# Patient Record
Sex: MALE
[De-identification: names, ages, dates, MRNs, and addresses within clinical notes are randomized per-mention and may not be internally consistent; named-entity substitution may affect disease eponyms.]

---

## 2022-11-09 PROBLEM — Z00.00 ENCOUNTER FOR PREVENTIVE HEALTH EXAMINATION: Status: ACTIVE | Noted: 2022-11-09

## 2022-11-15 ENCOUNTER — APPOINTMENT (OUTPATIENT)
Dept: OTOLARYNGOLOGY | Facility: CLINIC | Age: 67
End: 2022-11-15

## 2022-12-16 ENCOUNTER — APPOINTMENT (OUTPATIENT)
Dept: OTOLARYNGOLOGY | Facility: CLINIC | Age: 67
End: 2022-12-16

## 2023-01-05 ENCOUNTER — APPOINTMENT (OUTPATIENT)
Dept: OTOLARYNGOLOGY | Facility: CLINIC | Age: 68
End: 2023-01-05
Payer: MEDICARE

## 2023-01-05 DIAGNOSIS — F17.200 NICOTINE DEPENDENCE, UNSPECIFIED, UNCOMPLICATED: ICD-10-CM

## 2023-01-05 DIAGNOSIS — Z86.79 PERSONAL HISTORY OF OTHER DISEASES OF THE CIRCULATORY SYSTEM: ICD-10-CM

## 2023-01-05 DIAGNOSIS — Z86.018 PERSONAL HISTORY OF OTHER BENIGN NEOPLASM: ICD-10-CM

## 2023-01-05 DIAGNOSIS — R04.0 EPISTAXIS: ICD-10-CM

## 2023-01-05 DIAGNOSIS — Z80.9 FAMILY HISTORY OF MALIGNANT NEOPLASM, UNSPECIFIED: ICD-10-CM

## 2023-01-05 DIAGNOSIS — Z78.9 OTHER SPECIFIED HEALTH STATUS: ICD-10-CM

## 2023-01-05 DIAGNOSIS — Z85.828 PERSONAL HISTORY OF OTHER MALIGNANT NEOPLASM OF SKIN: ICD-10-CM

## 2023-01-05 DIAGNOSIS — Z21 ASYMPTOMATIC HUMAN IMMUNODEFICIENCY VIRUS [HIV] INFECTION STATUS: ICD-10-CM

## 2023-01-05 DIAGNOSIS — B20 HUMAN IMMUNODEFICIENCY VIRUS [HIV] DISEASE: ICD-10-CM

## 2023-01-05 DIAGNOSIS — Z86.19 PERSONAL HISTORY OF OTHER INFECTIOUS AND PARASITIC DISEASES: ICD-10-CM

## 2023-01-05 PROCEDURE — 31231 NASAL ENDOSCOPY DX: CPT

## 2023-01-05 PROCEDURE — 99202 OFFICE O/P NEW SF 15 MIN: CPT | Mod: 25

## 2023-01-05 RX ORDER — DARUNAVIR 800 MG/1
TABLET, FILM COATED ORAL
Refills: 0 | Status: ACTIVE | COMMUNITY

## 2023-01-05 RX ORDER — RITONAVIR 100 MG/1
CAPSULE ORAL
Refills: 0 | Status: ACTIVE | COMMUNITY

## 2023-01-05 RX ORDER — ETRAVIRINE 25 MG/1
TABLET ORAL
Refills: 0 | Status: ACTIVE | COMMUNITY

## 2023-01-05 RX ORDER — HYDROCHLOROTHIAZIDE 12.5 MG/1
TABLET ORAL
Refills: 0 | Status: ACTIVE | COMMUNITY

## 2023-01-06 PROBLEM — R04.0 NOSEBLEED: Status: ACTIVE | Noted: 2023-01-05

## 2023-01-06 NOTE — ASSESSMENT
[FreeTextEntry1] : No evidence of active bleed or lesion.\par I recommend XLear.\par I recommend Vaseline to the front of his nose before sleep.\par Follow-up on an as-needed basis.

## 2023-01-06 NOTE — HISTORY OF PRESENT ILLNESS
[de-identified] : Reports that approximately 1 month ago he had an episode of congestion in his nose.  When he manipulated his nose he developed very significant epistaxis.  This required a visit to the emergency room.  By his report they had difficulty controlling the bleeding.\par He has not had any bleeding since that time.\par He does not have a history of bleeding dyscrasias.\par He does not take blood thinning medication.

## 2023-01-06 NOTE — PROCEDURE
[FreeTextEntry6] : PROCEDURE NOTES\par \par PROCEDURE: Nasal endoscopy \par SURGEON: Dr. Collins\par INDICATIONS: Assess for chronic sinusitis. \par ANESTHESIA: The patient was placed in a sitting position.  Following application of the topical anesthetic and decongestant, exam was performed with a zero degree endoscope.  The scope was passed along the right nasal floor to the nasopharynx.  It was then passed into the region of the middle meatus, middle turbinate, and sphenoethmoid region.  An identical procedure was performed on the left side.  The following findings were noted:\par \par The nasal mucosa was healthy appearing and the septum was roughly midline. The middle meatus and sphenoethmoid recesses were clear bilaterally. The nasopharynx was normal. \par

## 2023-05-24 ENCOUNTER — APPOINTMENT (OUTPATIENT)
Dept: COLORECTAL SURGERY | Facility: CLINIC | Age: 68
End: 2023-05-24

## 2023-05-24 NOTE — HISTORY OF PRESENT ILLNESS
[FreeTextEntry1] : 69 yo M scheduled for initial evaluation of anal mass\par \par Per outside record scanned November 2022, followed by PCP at [facility]. \par h/o HIV on Biktarvy, Prezista/norvir, etravirine, CD4 300s as of November 2022, HSV, warts, anal mass, constipation, GERD, bipolar with anxiety, anemia, HTN, testicular hypofunction, hypogonadism, tobacco use\par h/o anal cancer, s/p surgery in 2017\par \par November 2022 labs notable for normocytic anemia, hgb/hct 7.8/24.3, with normal total iron (415), ferrintin 60, iron saturation 95%, on oral iron and Bcomplex/B12,and multivitamin\par \par Per outside record, referral placed to CRS on 11/2, then again, urgent referral placed to CRS at [facility]  on 11/14/2022 for large rectal mass, given h/o HIV and anemia to r/o anal cancer\par \par Pt scheduled for visit on 5/24/23. Office attempted on 5/22-5/23 to reschedule visit to 5/23, however unable to reach pt. Pt also no showed 5/24/23 visit.

## 2024-06-13 ENCOUNTER — APPOINTMENT (OUTPATIENT)
Dept: UROLOGY | Facility: CLINIC | Age: 69
End: 2024-06-13

## 2024-06-13 VITALS
SYSTOLIC BLOOD PRESSURE: 113 MMHG | HEIGHT: 67 IN | TEMPERATURE: 98.3 F | DIASTOLIC BLOOD PRESSURE: 77 MMHG | BODY MASS INDEX: 21.97 KG/M2 | WEIGHT: 140 LBS

## 2024-06-13 DIAGNOSIS — Z86.73 PERSONAL HISTORY OF TRANSIENT ISCHEMIC ATTACK (TIA), AND CEREBRAL INFARCTION W/OUT RESIDUAL DEFICITS: ICD-10-CM

## 2024-06-13 DIAGNOSIS — R97.20 ELEVATED PROSTATE, SPECIFIC ANTIGEN [PSA]: ICD-10-CM

## 2024-06-13 DIAGNOSIS — Z86.79 PERSONAL HISTORY OF OTHER DISEASES OF THE CIRCULATORY SYSTEM: ICD-10-CM

## 2024-06-13 PROCEDURE — ZZZZZ: CPT

## 2024-06-13 NOTE — PHYSICAL EXAM
[General Appearance - Well Developed] : well developed [General Appearance - In No Acute Distress] : no acute distress [] : no respiratory distress [No Focal Deficits] : no focal deficits [Oriented To Time, Place, And Person] : oriented to person, place, and time

## 2024-06-13 NOTE — HISTORY OF PRESENT ILLNESS
[FreeTextEntry1] :  CC: Elevated PSA  69 year old male h/o HIV on Biktarvy, Prezista/norvir, etravirine, CD4 300s as of November 2022, HSV, warts, anal mass, constipation, GERD, bipolar with anxiety, anemia, HTN, testicular hypofunction, hypogonadism, tobacco use h/o anal cancer, s/p surgery in 2017  No family hx of prostate cancer No prior prostate biopsy or MRI  PSA 8.9 ng/mL  2/2024  Urinary symptoms include urinary urgency and frequency, nocturia x 2-3, occasional straining to initiate stream,  Denies any hematuria or dysuria.  Believes he may be on finasteride but is not sure   Surgical Hx: none Social Hx: current smoker 10-12 cigarettes/day for 50 years, daily marijuana use, no ETOH Family Hx: noncontributory, No CaP

## 2024-06-13 NOTE — ASSESSMENT
[FreeTextEntry1] : Diagnosis: Elevated PSA  Plan: PVR 21  cc PSA today MRI prostate then consider biopsy based on MRI findings Follow up with colorectal surgery   RTC after MRI     Charles Alexander MD, FACS, FRCS  of Urology [facility] Director of Laparoscopic and Robotic Surgery [facility] Director of Urology, [facility] Professor of Urology (Office) [phone] (Cell) [phone] [email or web address]   I performed history/review of imaging, discussed treatment plan with patient, agree with above transcription by ANTON.

## 2024-06-14 LAB — PSA SERPL-MCNC: 7.44 NG/ML

## 2024-06-20 ENCOUNTER — RESULT REVIEW (OUTPATIENT)
Age: 69
End: 2024-06-20

## 2024-06-20 ENCOUNTER — APPOINTMENT (OUTPATIENT)
Dept: MRI IMAGING | Facility: CLINIC | Age: 69
End: 2024-06-20
Payer: MEDICARE

## 2024-06-20 ENCOUNTER — OUTPATIENT (OUTPATIENT)
Dept: OUTPATIENT SERVICES | Facility: HOSPITAL | Age: 69
LOS: 1 days | End: 2024-06-20

## 2024-06-20 PROCEDURE — 72197 MRI PELVIS W/O & W/DYE: CPT | Mod: 26

## 2024-06-20 PROCEDURE — 76498P: CUSTOM | Mod: 26

## 2024-06-21 ENCOUNTER — NON-APPOINTMENT (OUTPATIENT)
Age: 69
End: 2024-06-21

## 2024-06-21 ENCOUNTER — APPOINTMENT (OUTPATIENT)
Dept: COLORECTAL SURGERY | Facility: CLINIC | Age: 69
End: 2024-06-21

## 2024-06-21 VITALS
TEMPERATURE: 98.1 F | HEART RATE: 71 BPM | BODY MASS INDEX: 21.19 KG/M2 | SYSTOLIC BLOOD PRESSURE: 134 MMHG | WEIGHT: 135 LBS | HEIGHT: 67 IN | DIASTOLIC BLOOD PRESSURE: 84 MMHG

## 2024-06-21 DIAGNOSIS — K62.82 DYSPLASIA OF ANUS: ICD-10-CM

## 2024-06-21 PROCEDURE — 11104 PUNCH BX SKIN SINGLE LESION: CPT

## 2024-06-21 PROCEDURE — 46600 DIAGNOSTIC ANOSCOPY SPX: CPT

## 2024-06-21 PROCEDURE — 99203 OFFICE O/P NEW LOW 30 MIN: CPT | Mod: 25

## 2024-06-21 NOTE — PHYSICAL EXAM
[Lax] : was lax [None] : there was no rectal mass  [de-identified] : Scattered areas along the perianal margin/verge of hyperpigmentation and warts.  Left anterior anal margin area of skin thickening.  Biopsy x 2.  Fulgurated. [de-identified] : Evidence of rectal mucosal prolapse nearly circumferential partially reducible [FreeTextEntry1] : Medical assistant was present for the entire exam.  Anoscopy was performed for evaluation of the patients rectal bleeding  history . The risks, benefits and alternatives were reviewed.  A lighted anoscope was passed into the anal canal and the entire anal mucosal surface was inspected..   The findings revealed moderate internal hemorrhoids. No masses or lesions were identified.

## 2024-06-21 NOTE — HISTORY OF PRESENT ILLNESS
[FreeTextEntry1] : 70 y/o M presents for initial evaluation for rectal pain  referred by Dr. Shafer  PMH: HIV, HTN PSH: Skin Cancer perianal area about 2-3 years ago, patient states was told he had HPV FH: Denies CRC/IBD Meds: HCTZ, Intelence, Norvir, Prezista,  Allergies: NKDA  Last Colonoscopy as per patient more than 10 years ago.   As per outside records from 11/2022 h/o HIV on Biktarvy, Prezista/norvir, etravirine, HSV, warts, anal mass, constipation, GERD, bipolar with anxiety, anemia, HTN, testicular hypofunction, hypogonadism, tobacco use  h/o anal cancer, s/p surgery in 2017  Per outside record, referral placed to CRS on 11/2/2022, then again, urgent referral placed to CRS at [facility] on 11/14/2022 for large rectal mass, given h/o HIV and anemia to r/o anal cancer  Pt scheduled for visit on 5/24/23. Office attempted on 5/22-5/23 to contact pt to reschedule visit to 5/23, however unable to reach pt. Pt no showed 5/24/23 visit.  Patient presents w/ c/o external bump for several months that is itchy. Hx of perianal lesion removed as per patient told it was skin cancer and HPV.   Hx of HIV, last labs from 02/2024 CD4 378, VL 20, Hgb 10.9, Hct 34.2, STI triple testing negative.   Notable PSA 8.9, seen by Urologist Dr. Alexander and imaging ordered  MRI Pelvis Prostate done 06/20/2024 at [facility] Impression: PIRADS 2-low (clinically significant cancer is unlikely to be present.  Of note: Mild patchy T2 hypointensities bilateral peripheral zone w/o restricted diffusion but with enhancement post gadolinium could represent inflammation/Proctitis.

## 2024-06-21 NOTE — ASSESSMENT
[FreeTextEntry1] : Anal dysplasia questionable anal cancer. HIV positive.  Recommend follow-up biopsy.  Potential role for further treatment including excision versus topical 5-FU therapy outlined.

## 2024-07-19 ENCOUNTER — NON-APPOINTMENT (OUTPATIENT)
Age: 69
End: 2024-07-19

## 2024-07-26 DIAGNOSIS — L29.0 PRURITUS ANI: ICD-10-CM

## 2024-08-12 ENCOUNTER — APPOINTMENT (OUTPATIENT)
Dept: COLORECTAL SURGERY | Facility: CLINIC | Age: 69
End: 2024-08-12

## 2024-08-12 VITALS
WEIGHT: 130 LBS | HEIGHT: 67 IN | TEMPERATURE: 98 F | BODY MASS INDEX: 20.4 KG/M2 | HEART RATE: 76 BPM | DIASTOLIC BLOOD PRESSURE: 79 MMHG | SYSTOLIC BLOOD PRESSURE: 131 MMHG

## 2024-08-12 DIAGNOSIS — K62.82 DYSPLASIA OF ANUS: ICD-10-CM

## 2024-08-12 PROCEDURE — 99213 OFFICE O/P EST LOW 20 MIN: CPT

## 2024-08-12 RX ORDER — FLUOROURACIL 50 MG/G
5 CREAM TOPICAL TWICE DAILY
Qty: 3 | Refills: 2 | Status: ACTIVE | COMMUNITY
Start: 2024-08-12 | End: 1900-01-01

## 2024-08-12 NOTE — ASSESSMENT
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  Recommend topical Efudex.  Recommend return in 3 months for repeat evaluation and biopsy.

## 2024-08-12 NOTE — PHYSICAL EXAM
[Lax] : was lax [None] : there was no rectal mass  [de-identified] : Scattered areas along the perianal margin/verge of hyperpigmentation and white thickening. [de-identified] : Evidence of rectal mucosal prolapse nearly circumferential partially reducible

## 2024-08-12 NOTE — HISTORY OF PRESENT ILLNESS
[FreeTextEntry1] : 70 y/o M presents for follow up.   Initially referred by Dr. Shafer  PMH: HIV, HTN PSH: Skin Cancer perianal area about 2-3 years ago, patient states was told he had HPV FH: Denies CRC/IBD Meds: HCTZ, Intelence, Norvir, Prezista, Allergies: NKDA  Last Colonoscopy as per patient more than 10 years ago.  As per outside records from 11/2022 h/o HIV on Biktarvy, Prezista/norvir, etravirine, HSV, warts, anal mass, constipation, GERD, bipolar with anxiety, anemia, HTN, testicular hypofunction, hypogonadism, tobacco use  h/o anal cancer, s/p surgery in 2017  Per outside record, referral placed to CRS on 11/2/2022, then again, urgent referral placed to CRS at [facility] on 11/14/2022 for large rectal mass, given h/o HIV and anemia to r/o anal cancer  Pt scheduled for visit on 5/24/23. Office attempted on 5/22-5/23 to contact pt to reschedule visit to 5/23, however unable to reach pt. Pt no showed 5/24/23 visit.  Hx of HIV, last labs from 02/2024 CD4 378, VL 20, Hgb 10.9, Hct 34.2, STI triple testing negative.  Notable PSA 8.9, seen by Urologist Dr. Alexander and imaging ordered  MRI Pelvis Prostate done 06/20/2024 at [facility] Impression: PIRADS 2-low (clinically significant cancer is unlikely to be present. Of note: Mild patchy T2 hypointensities bilateral peripheral zone w/o restricted diffusion but with enhancement post gadolinium could represent inflammation/Proctitis.  Seen for initial visit on 06/21/2024 c/o external bump for several months that is itchy. Hx of perianal lesion removed as per patient told it was skin cancer and HPV.  Physical exam showed scattered areas along the perianal margin/verge of hyperpigmentation and warts. Left anterior anal margin area of skin thickening. Biopsy x 2. Fulgurated. The sphincter tone was lax. There was no rectal tenderness present. There was no rectal mass. Evidence of rectal mucosal prolapse nearly circumferential partially reducible.  Recommend follow up biopsy. Potential role for further treatment including excision versus topical 5-FU therapy outlined.  Pathology: Left anterior anal margin: High-grade squamous intraepithelial lesion (HSIL/AIN-2).  Patient presents today for follow up, patient reports tolerated procedure well. Reports continues with itching has been using OTC Balm-X with some improvement in itching but it comes back.  Denies any bleeding with BM.   Took Meloxicam 2-3 days ago. Denies Aspirin use in the last 7 days.